# Patient Record
Sex: MALE | Race: WHITE | NOT HISPANIC OR LATINO | Employment: OTHER
[De-identification: names, ages, dates, MRNs, and addresses within clinical notes are randomized per-mention and may not be internally consistent; named-entity substitution may affect disease eponyms.]

---

## 2022-01-01 ENCOUNTER — PRE VISIT (OUTPATIENT)
Dept: OTHER | Age: 72
End: 2022-01-01

## 2022-01-01 ENCOUNTER — TRANSCRIBE ORDERS (OUTPATIENT)
Dept: OTHER | Age: 72
End: 2022-01-01

## 2022-01-01 DIAGNOSIS — C25.9 PANCREATIC ADENOCARCINOMA (H): Primary | ICD-10-CM

## 2022-09-29 NOTE — TELEPHONE ENCOUNTER
Action 09/29/2022@1145AM CDK   Action Taken REQUEST SENT TO URGENCY AND ROOM AND FLIP FOR IMAGING  CDK

## 2022-10-05 NOTE — TELEPHONE ENCOUNTER
Action October 5, 2022 11:50 AM ABT   Action Taken Records from [facility] received and sent to HIM for upload.    Some images from [facility] received and resolved to PACS. Called and unable to speak to [facility] Tavo Venegas, fax sent for 09/20/22 US Abd       RECORDS STATUS - ALL OTHER DIAGNOSIS      RECORDS RECEIVED FROM: [facility], [facility]   DATE RECEIVED:    NOTES STATUS DETAILS   OFFICE NOTE from referring provider External: [facility] 09/27/22: Dr. Jatinder Luis   OFFICE NOTE from medical oncologist     DISCHARGE SUMMARY from hospital [facility] 09/20/22: [facility]   DISCHARGE REPORT from the ER [facility] 09/20/22: [facility]   OPERATIVE REPORT [facility] 09/22/22: ENDOSCOPIC ULTRASOUND with FNA  09/21/22: ENDOSCOPIC RETROGRADE CHOLANGIOPANCREATOGRAPHY   MEDICATION LIST External: [facility]    CLINICAL TRIAL TREATMENTS TO DATE     LABS     PATHOLOGY REPORTS [facility] 09/22/22: [id number]   ANYTHING RELATED TO DIAGNOSIS [facility] Most recent 10/05/22   GENONOMIC TESTING     TYPE:     IMAGING (NEED IMAGES & REPORT)     CT SCANS PACS 09/22/22: CT Chest  09/21/22: CT Abd Pancreas   MRI PACS 09/21/22: MR Abd   PACS PACS 09/21/22: XR ERCP   ULTRASOUND Req 10/[facility] 09/20/22: US Abd   PET